# Patient Record
Sex: MALE | Race: WHITE | NOT HISPANIC OR LATINO | Employment: FULL TIME | URBAN - METROPOLITAN AREA
[De-identification: names, ages, dates, MRNs, and addresses within clinical notes are randomized per-mention and may not be internally consistent; named-entity substitution may affect disease eponyms.]

---

## 2023-08-08 ENCOUNTER — LAB (OUTPATIENT)
Dept: LAB | Facility: LAB | Age: 38
End: 2023-08-08
Payer: COMMERCIAL

## 2023-08-08 ENCOUNTER — OFFICE VISIT (OUTPATIENT)
Dept: PRIMARY CARE | Facility: CLINIC | Age: 38
End: 2023-08-08
Payer: COMMERCIAL

## 2023-08-08 VITALS
DIASTOLIC BLOOD PRESSURE: 80 MMHG | BODY MASS INDEX: 25.43 KG/M2 | SYSTOLIC BLOOD PRESSURE: 132 MMHG | HEART RATE: 79 BPM | WEIGHT: 162 LBS | HEIGHT: 67 IN | OXYGEN SATURATION: 97 %

## 2023-08-08 DIAGNOSIS — R07.89 ATYPICAL CHEST PAIN: ICD-10-CM

## 2023-08-08 DIAGNOSIS — F41.9 ANXIETY: ICD-10-CM

## 2023-08-08 DIAGNOSIS — E66.3 OVERWEIGHT (BMI 25.0-29.9): ICD-10-CM

## 2023-08-08 DIAGNOSIS — F43.0 ACUTE REACTION TO STRESS: Primary | ICD-10-CM

## 2023-08-08 LAB
ALANINE AMINOTRANSFERASE (SGPT) (U/L) IN SER/PLAS: 10 U/L (ref 10–52)
ALBUMIN (G/DL) IN SER/PLAS: 4.8 G/DL (ref 3.4–5)
ALKALINE PHOSPHATASE (U/L) IN SER/PLAS: 62 U/L (ref 33–120)
ANION GAP IN SER/PLAS: 15 MMOL/L (ref 10–20)
ASPARTATE AMINOTRANSFERASE (SGOT) (U/L) IN SER/PLAS: 14 U/L (ref 9–39)
BASOPHILS (10*3/UL) IN BLOOD BY AUTOMATED COUNT: 0.07 X10E9/L (ref 0–0.1)
BASOPHILS/100 LEUKOCYTES IN BLOOD BY AUTOMATED COUNT: 1.2 % (ref 0–2)
BILIRUBIN TOTAL (MG/DL) IN SER/PLAS: 0.7 MG/DL (ref 0–1.2)
CALCIUM (MG/DL) IN SER/PLAS: 9.9 MG/DL (ref 8.6–10.6)
CARBON DIOXIDE, TOTAL (MMOL/L) IN SER/PLAS: 28 MMOL/L (ref 21–32)
CHLORIDE (MMOL/L) IN SER/PLAS: 102 MMOL/L (ref 98–107)
CHOLESTEROL (MG/DL) IN SER/PLAS: 168 MG/DL (ref 0–199)
CHOLESTEROL IN HDL (MG/DL) IN SER/PLAS: 76.7 MG/DL
CHOLESTEROL/HDL RATIO: 2.2
CREATININE (MG/DL) IN SER/PLAS: 0.95 MG/DL (ref 0.5–1.3)
EOSINOPHILS (10*3/UL) IN BLOOD BY AUTOMATED COUNT: 0.09 X10E9/L (ref 0–0.7)
EOSINOPHILS/100 LEUKOCYTES IN BLOOD BY AUTOMATED COUNT: 1.6 % (ref 0–6)
ERYTHROCYTE DISTRIBUTION WIDTH (RATIO) BY AUTOMATED COUNT: 12.2 % (ref 11.5–14.5)
ERYTHROCYTE MEAN CORPUSCULAR HEMOGLOBIN CONCENTRATION (G/DL) BY AUTOMATED: 32.6 G/DL (ref 32–36)
ERYTHROCYTE MEAN CORPUSCULAR VOLUME (FL) BY AUTOMATED COUNT: 93 FL (ref 80–100)
ERYTHROCYTES (10*6/UL) IN BLOOD BY AUTOMATED COUNT: 5.09 X10E12/L (ref 4.5–5.9)
GFR MALE: >90 ML/MIN/1.73M2
GLUCOSE (MG/DL) IN SER/PLAS: 85 MG/DL (ref 74–99)
HEMATOCRIT (%) IN BLOOD BY AUTOMATED COUNT: 47.2 % (ref 41–52)
HEMOGLOBIN (G/DL) IN BLOOD: 15.4 G/DL (ref 13.5–17.5)
IMMATURE GRANULOCYTES/100 LEUKOCYTES IN BLOOD BY AUTOMATED COUNT: 0.2 % (ref 0–0.9)
LDL: 79 MG/DL (ref 0–99)
LEUKOCYTES (10*3/UL) IN BLOOD BY AUTOMATED COUNT: 5.8 X10E9/L (ref 4.4–11.3)
LYMPHOCYTES (10*3/UL) IN BLOOD BY AUTOMATED COUNT: 1.35 X10E9/L (ref 1.2–4.8)
LYMPHOCYTES/100 LEUKOCYTES IN BLOOD BY AUTOMATED COUNT: 23.4 % (ref 13–44)
MONOCYTES (10*3/UL) IN BLOOD BY AUTOMATED COUNT: 0.57 X10E9/L (ref 0.1–1)
MONOCYTES/100 LEUKOCYTES IN BLOOD BY AUTOMATED COUNT: 9.9 % (ref 2–10)
NEUTROPHILS (10*3/UL) IN BLOOD BY AUTOMATED COUNT: 3.69 X10E9/L (ref 1.2–7.7)
NEUTROPHILS/100 LEUKOCYTES IN BLOOD BY AUTOMATED COUNT: 63.7 % (ref 40–80)
NRBC (PER 100 WBCS) BY AUTOMATED COUNT: 0 /100 WBC (ref 0–0)
PLATELETS (10*3/UL) IN BLOOD AUTOMATED COUNT: 314 X10E9/L (ref 150–450)
POTASSIUM (MMOL/L) IN SER/PLAS: 4.1 MMOL/L (ref 3.5–5.3)
PROTEIN TOTAL: 7.5 G/DL (ref 6.4–8.2)
SODIUM (MMOL/L) IN SER/PLAS: 141 MMOL/L (ref 136–145)
THYROTROPIN (MIU/L) IN SER/PLAS BY DETECTION LIMIT <= 0.05 MIU/L: 1.67 MIU/L (ref 0.44–3.98)
TRIGLYCERIDE (MG/DL) IN SER/PLAS: 61 MG/DL (ref 0–149)
UREA NITROGEN (MG/DL) IN SER/PLAS: 10 MG/DL (ref 6–23)
VLDL: 12 MG/DL (ref 0–40)

## 2023-08-08 PROCEDURE — 84443 ASSAY THYROID STIM HORMONE: CPT

## 2023-08-08 PROCEDURE — 85025 COMPLETE CBC W/AUTO DIFF WBC: CPT

## 2023-08-08 PROCEDURE — 93272 ECG/REVIEW INTERPRET ONLY: CPT | Performed by: STUDENT IN AN ORGANIZED HEALTH CARE EDUCATION/TRAINING PROGRAM

## 2023-08-08 PROCEDURE — 96127 BRIEF EMOTIONAL/BEHAV ASSMT: CPT | Performed by: STUDENT IN AN ORGANIZED HEALTH CARE EDUCATION/TRAINING PROGRAM

## 2023-08-08 PROCEDURE — 80061 LIPID PANEL: CPT

## 2023-08-08 PROCEDURE — 36415 COLL VENOUS BLD VENIPUNCTURE: CPT

## 2023-08-08 PROCEDURE — 80053 COMPREHEN METABOLIC PANEL: CPT

## 2023-08-08 PROCEDURE — 99204 OFFICE O/P NEW MOD 45 MIN: CPT | Performed by: STUDENT IN AN ORGANIZED HEALTH CARE EDUCATION/TRAINING PROGRAM

## 2023-08-08 PROCEDURE — 93000 ELECTROCARDIOGRAM COMPLETE: CPT | Performed by: STUDENT IN AN ORGANIZED HEALTH CARE EDUCATION/TRAINING PROGRAM

## 2023-08-08 PROCEDURE — 1036F TOBACCO NON-USER: CPT | Performed by: STUDENT IN AN ORGANIZED HEALTH CARE EDUCATION/TRAINING PROGRAM

## 2023-08-08 NOTE — PROGRESS NOTES
Subjective   Patient ID: Ivan Greenberg is a 37 y.o. male who presents for the following    Assessment/Plan     #Atypical Chest Pain vs Non-Cardiac Chest Pain  #Acute Stress Reaction  #Generalized Anxiety Disorder  #Overweight (BMI 25.37)      PLAN  -CBC-D, CMP, Lipid Panel, TSH, A1c  -EKG ordered; Reviewed. Shows no acute STT changes. Normal Axis. Rate in 70s regular.   -Healthy diet and exercise counseling   -Aerobic exercise 3 x weekly counseling   -Caffeine avoidance as tolerated   -Patient does not want medication for anxiety. He will consider medications and let us know. At that time we can start Buspar.   -Continue working with your grief counselor.   -UTD on vaccinations     Follow up in 6 months pending results of labs       HPI  37M presents to [facility] care. He previously went to [facility] for care but has not seen a PCP in a few years. No significant PMH at this time. He states that he saw a cardiologist in 2015 related to heart palpitations. He had a holter monitor placed and he had no abnormal findings.      Currently he is having similar symptoms to what he was having in 2015. He has had associated feelings of fatigue, dizziness. He states that he has been stressed out due to an unexpected death in the family in June 2023 and that is when his symptoms started. His associated symptoms are central chest pain (non-radiating) that improves with deep breaths. Pain is intermittent and not associated with activity. He has normal exercise tolerance. Patient does acknowledge that his stress levels have been abnormally high and that he is going on vacation soon which should help decrease the stress levels.     He denies any SIGECAPS symptoms surrounding the death of his brother in law, but states that his family is working with grief counselors.      Denies fevers, chills, vision changes, HA, syncope, palpitations, cough, n/v/d/c, black/bloody stools, urinary changes, arthralgias, or numbness/weakness/tingling in arms or  "legs.    Diet is relatively healthy but he has a sedentary lifestyle.     Surgeries: abdominal hernia surgery at age 1     Family Hx  Maternal: hematologic cancer in grandmater, skin cancer in mom, DM, HTN  Paternal: CVA and MI in grandfather.     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     Sexual Hx   No history of STIs  Sexually active with wife without barrier protection       Visit Vitals  /80   Pulse 79   Ht 1.702 m (5' 7\")   Wt 73.5 kg (162 lb)   SpO2 97%   BMI 25.37 kg/m²   Smoking Status Never   BSA 1.86 m²        General: NAD. NCAT. Aox3   HEENT: PERRLA. EOMI. MMM. Nares patent bl.  Cardiovascular: RRR. No MRG. S1/S2 wnl.   Respiratory: CTABL. No acute respiratory distress.   GI: Soft, NT abdomen. BS present x 4.   : No CVAT BL  MSK: ROM x 4. CTLS non-tender.   Extremities: No edema. Cap refill < 2 sec.   Skin: No rashes or bruises.   Neuro: Aox3. Cranial Nerves grossly intact. Motor/sensory wnl.   Psych: Mood wnl.  SIGECAPS negative. No SI    REVIEW OF SYSTEMS     ROS reviewed in HPI     No Known Allergies    No current outpatient medications on file.     No current facility-administered medications for this visit.       Objective     No visits with results within 4 Month(s) from this visit.   Latest known visit with results is:   No results found for any previous visit.       Radiology: Reviewed imaging in powerchart.  No results found.    No family history on file.  Social History     Socioeconomic History    Marital status: Single     Spouse name: None    Number of children: None    Years of education: None    Highest education level: None   Occupational History    None   Tobacco Use    Smoking status: Never    Smokeless tobacco: Never   Substance and Sexual Activity    Alcohol use: Yes    Drug use: None    Sexual activity: None   Other Topics Concern    None   Social History Narrative    " None     Social Determinants of Health     Financial Resource Strain: Not on file   Food Insecurity: Not on file   Transportation Needs: Not on file   Physical Activity: Not on file   Stress: Not on file   Social Connections: Not on file   Intimate Partner Violence: Not on file   Housing Stability: Not on file     History reviewed. No pertinent past medical history.  History reviewed. No pertinent surgical history.    Charting was completed using voice recognition technology and may include unintended errors.

## 2023-08-09 ENCOUNTER — APPOINTMENT (OUTPATIENT)
Dept: PRIMARY CARE | Facility: CLINIC | Age: 38
End: 2023-08-09
Payer: COMMERCIAL

## 2024-06-06 ENCOUNTER — OFFICE VISIT (OUTPATIENT)
Dept: PRIMARY CARE | Facility: CLINIC | Age: 39
End: 2024-06-06
Payer: COMMERCIAL

## 2024-06-06 VITALS
SYSTOLIC BLOOD PRESSURE: 128 MMHG | DIASTOLIC BLOOD PRESSURE: 81 MMHG | BODY MASS INDEX: 26.53 KG/M2 | HEART RATE: 73 BPM | HEIGHT: 67 IN | OXYGEN SATURATION: 99 % | WEIGHT: 169 LBS

## 2024-06-06 DIAGNOSIS — R03.0 ELEVATED BP WITHOUT DIAGNOSIS OF HYPERTENSION: Primary | ICD-10-CM

## 2024-06-06 DIAGNOSIS — Z13.220 LIPID SCREENING: ICD-10-CM

## 2024-06-06 NOTE — PROGRESS NOTES
Subjective   Patient ID: Ivan Greenberg is a 38 y.o. male who presents for the following    Assessment/Plan   Physical done 6/6/2024  Preventative Health Care  -UTD on vaccines  -Lipids wnl last year   -No age related cancer screening at this time  -No hx of depression noted.   -Alcohol screen done   -PHQ2: 0     #Acute Stress Reaction - resolved   #Chest pain - resolved   #Generalized Anxiety Disorder  #Overweight     -Echo done in April 2024: EF 59%. Normal echo.   -Zio patch in March 2024: No acute arrythmia  -Saw cardiology in March 2024    PLAN  -CBC-D, CMP, Lipid Panel, TSH,   -Healthy diet and exercise counseling   -Aerobic exercise 3 x weekly counseling   -Caffeine avoidance as tolerated   -Patient does not want medication for anxiety      #Elevated BP without HTN  -Repeat BP improving to 120/64  -Ambulatory  monitoring advised  -Low salt diet and caffeine avoidance    Paperwork for foster parenting filled out.           HPI  38-year-old male presents to have paperwork filled out and annual physical.  Currently doing well.  Was seen previously for atypical/noncardiac chest pain.  Saw cardiology in March 2024.  Zio patch and echo were both unremarkable.  Symptoms have largely resolved.  States that anxiety was mostly related to stress reaction from losing brother-in-law.    General health: Patient is doing well.  Eating healthy.  Attempting to stay active.    He needs annual physical today and paperwork filled out for continuing foster care.    No other complaints or concerns at this time    Denies fevers, chills, weight loss, lightheadedness, dizziness, vision changes, sore throat, runny nose, CP, SOB, cough, palpitations, n/v/d, abd pain, black/bloody stools, arthralgias, or new numbness/weakness/tingling in arms/legs/face.        Surgeries: abdominal hernia surgery at age 1     Family Hx  Maternal: hematologic cancer in grandmater, skin cancer in mom, DM, HTN  Paternal: CVA and MI in grandfather.     Social  "Hx  T: 4-5 cigars a year. No cigarette use.   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     Sexual Hx   No history of STIs  Sexually active with wife without barrier protection       Visit Vitals  /81   Pulse 73   Ht 1.702 m (5' 7\")   Wt 76.7 kg (169 lb)   SpO2 99%   BMI 26.47 kg/m²   Smoking Status Some Days   BSA 1.9 m²        General: NAD. NCAT. Aox3   HEENT:. EOMI. MMM. Nares patent bl.  Cardiovascular: RRR. No MRG. S1/S2 wnl.   Respiratory: CTABL. No acute respiratory distress.   GI: Soft, NT abdomen.   MSK: ROM x 4.   Extremities: No edema.   Skin: No rashes or bruises.   Neuro: Aox3. Cranial Nerves grossly intact. Motor/sensory wnl.   Psych: Mood wnl.  PHQ2: 0     REVIEW OF SYSTEMS     ROS reviewed in HPI     No Known Allergies    No current outpatient medications on file.     No current facility-administered medications for this visit.       Objective     No visits with results within 4 Month(s) from this visit.   Latest known visit with results is:   Lab on 08/08/2023   Component Date Value Ref Range Status    WBC 08/08/2023 5.8  4.4 - 11.3 x10E9/L Final    nRBC 08/08/2023 0.0  0.0 - 0.0 /100 WBC Final    RBC 08/08/2023 5.09  4.50 - 5.90 x10E12/L Final    Hemoglobin 08/08/2023 15.4  13.5 - 17.5 g/dL Final    Hematocrit 08/08/2023 47.2  41.0 - 52.0 % Final    MCV 08/08/2023 93  80 - 100 fL Final    MCHC 08/08/2023 32.6  32.0 - 36.0 g/dL Final    Platelets 08/08/2023 314  150 - 450 x10E9/L Final    RDW 08/08/2023 12.2  11.5 - 14.5 % Final    Neutrophils % 08/08/2023 63.7  40.0 - 80.0 % Final    Immature Granulocytes %, Automated 08/08/2023 0.2  0.0 - 0.9 % Final    Lymphocytes % 08/08/2023 23.4  13.0 - 44.0 % Final    Monocytes % 08/08/2023 9.9  2.0 - 10.0 % Final    Eosinophils % 08/08/2023 1.6  0.0 - 6.0 % Final    Basophils % 08/08/2023 1.2  0.0 - 2.0 % Final    Neutrophils Absolute 08/08/2023 3.69  1.20 - 7.70 x10E9/L " Final    Lymphocytes Absolute 08/08/2023 1.35  1.20 - 4.80 x10E9/L Final    Monocytes Absolute 08/08/2023 0.57  0.10 - 1.00 x10E9/L Final    Eosinophils Absolute 08/08/2023 0.09  0.00 - 0.70 x10E9/L Final    Basophils Absolute 08/08/2023 0.07  0.00 - 0.10 x10E9/L Final    Glucose 08/08/2023 85  74 - 99 mg/dL Final    Sodium 08/08/2023 141  136 - 145 mmol/L Final    Potassium 08/08/2023 4.1  3.5 - 5.3 mmol/L Final    Chloride 08/08/2023 102  98 - 107 mmol/L Final    Bicarbonate 08/08/2023 28  21 - 32 mmol/L Final    Anion Gap 08/08/2023 15  10 - 20 mmol/L Final    Urea Nitrogen 08/08/2023 10  6 - 23 mg/dL Final    Creatinine 08/08/2023 0.95  0.50 - 1.30 mg/dL Final    GFR MALE 08/08/2023 >90  >90 mL/min/1.73m2 Final    Calcium 08/08/2023 9.9  8.6 - 10.6 mg/dL Final    Albumin 08/08/2023 4.8  3.4 - 5.0 g/dL Final    Alkaline Phosphatase 08/08/2023 62  33 - 120 U/L Final    Total Protein 08/08/2023 7.5  6.4 - 8.2 g/dL Final    AST 08/08/2023 14  9 - 39 U/L Final    Total Bilirubin 08/08/2023 0.7  0.0 - 1.2 mg/dL Final    ALT (SGPT) 08/08/2023 10  10 - 52 U/L Final    Cholesterol 08/08/2023 168  0 - 199 mg/dL Final    HDL 08/08/2023 76.7  mg/dL Final    Cholesterol/HDL Ratio 08/08/2023 2.2   Final    LDL 08/08/2023 79  0 - 99 mg/dL Final    VLDL 08/08/2023 12  0 - 40 mg/dL Final    Triglycerides 08/08/2023 61  0 - 149 mg/dL Final    TSH 08/08/2023 1.67  0.44 - 3.98 mIU/L Final       Radiology: Reviewed imaging in powerchart.  No results found.    No family history on file.  Social History     Socioeconomic History    Marital status: Single     Spouse name: None    Number of children: None    Years of education: None    Highest education level: None   Occupational History    None   Tobacco Use    Smoking status: Some Days     Types: Cigars    Smokeless tobacco: Never   Substance and Sexual Activity    Alcohol use: Yes     Comment: occasional    Drug use: None    Sexual activity: None   Other Topics Concern    None    Social History Narrative    None     Social Determinants of Health     Financial Resource Strain: Low Risk  (4/17/2022)    Received from [facility]    Overall Financial Resource Strain (CARDIA)     Difficulty of Paying Living Expenses: Not very hard   Food Insecurity: No Food Insecurity (4/17/2022)    Received from [facility]    Hunger Vital Sign     Worried About Running Out of Food in the Last Year: Never true     Ran Out of Food in the Last Year: Never true   Transportation Needs: No Transportation Needs (4/17/2022)    Received from [facility]    PRAPARE - Transportation     Lack of Transportation (Medical): No     Lack of Transportation (Non-Medical): No   Physical Activity: Insufficiently Active (4/17/2022)    Received from [facility]    Exercise Vital Sign     Days of Exercise per Week: 3 days     Minutes of Exercise per Session: 30 min   Stress: Stress Concern Present (4/17/2022)    Received from [facility]    [employer] [place] [employer] - Occupational Stress Questionnaire     Feeling of Stress : To some extent   Social Connections: Moderately Integrated (4/17/2022)    Received from [facility]    Social Connection and Isolation Panel [NHANES]     Frequency of Communication with Friends and Family: Once a week     Frequency of Social Gatherings with Friends and Family: Once a week     Attends Oriental orthodox Services: More than 4 times per year     Active Member of Clubs or Organizations: Yes     Attends Club or Organization Meetings: More than 4 times per year     Marital Status:    Intimate Partner Violence: Not on file   Housing Stability: Low Risk  (4/17/2022)    Received from [facility]    Housing Stability Vital Sign     Unable to Pay for Housing in the Last Year: No     Number of Places Lived in the Last Year: 1     Unstable Housing in the Last Year: No     No past medical history on file.  No past surgical history on file.    Charting was  completed using voice recognition technology and may include unintended errors.